# Patient Record
Sex: FEMALE | Race: WHITE | NOT HISPANIC OR LATINO | Employment: FULL TIME | URBAN - METROPOLITAN AREA
[De-identification: names, ages, dates, MRNs, and addresses within clinical notes are randomized per-mention and may not be internally consistent; named-entity substitution may affect disease eponyms.]

---

## 2024-07-18 ENCOUNTER — HOSPITAL ENCOUNTER (EMERGENCY)
Facility: HOSPITAL | Age: 34
Discharge: HOME | End: 2024-07-18
Attending: EMERGENCY MEDICINE
Payer: MEDICAID

## 2024-07-18 VITALS
BODY MASS INDEX: 31.39 KG/M2 | RESPIRATION RATE: 13 BRPM | OXYGEN SATURATION: 99 % | TEMPERATURE: 99 F | HEART RATE: 77 BPM | SYSTOLIC BLOOD PRESSURE: 111 MMHG | HEIGHT: 67 IN | WEIGHT: 200 LBS | DIASTOLIC BLOOD PRESSURE: 89 MMHG

## 2024-07-18 DIAGNOSIS — J18.9 PNEUMONIA OF RIGHT LUNG DUE TO INFECTIOUS ORGANISM, UNSPECIFIED PART OF LUNG: ICD-10-CM

## 2024-07-18 DIAGNOSIS — T14.90XA TRAUMA: ICD-10-CM

## 2024-07-18 DIAGNOSIS — R56.9 SEIZURE: Primary | ICD-10-CM

## 2024-07-18 DIAGNOSIS — R07.9 CHEST PAIN: ICD-10-CM

## 2024-07-18 LAB
ALBUMIN SERPL-MCNC: 3.8 G/DL (ref 3.5–5)
ALBUMIN/GLOB SERPL: 1.4 RATIO (ref 1.1–2)
ALP SERPL-CCNC: 42 UNIT/L (ref 40–150)
ALT SERPL-CCNC: 19 UNIT/L (ref 0–55)
ANION GAP SERPL CALC-SCNC: 12 MEQ/L
APTT PPP: 31.3 SECONDS (ref 23.2–33.7)
AST SERPL-CCNC: 21 UNIT/L (ref 5–34)
BASOPHILS # BLD AUTO: 0.07 X10(3)/MCL
BASOPHILS NFR BLD AUTO: 1.1 %
BILIRUB SERPL-MCNC: 0.3 MG/DL
BUN SERPL-MCNC: 8.4 MG/DL (ref 7–18.7)
CALCIUM SERPL-MCNC: 8.9 MG/DL (ref 8.4–10.2)
CHLORIDE SERPL-SCNC: 107 MMOL/L (ref 98–107)
CO2 SERPL-SCNC: 20 MMOL/L (ref 22–29)
CREAT SERPL-MCNC: 0.99 MG/DL (ref 0.55–1.02)
CREAT/UREA NIT SERPL: 8
EOSINOPHIL # BLD AUTO: 0.11 X10(3)/MCL (ref 0–0.9)
EOSINOPHIL NFR BLD AUTO: 1.8 %
ERYTHROCYTE [DISTWIDTH] IN BLOOD BY AUTOMATED COUNT: 12.4 % (ref 11.5–17)
ETHANOL SERPL-MCNC: <10 MG/DL
FLUAV AG UPPER RESP QL IA.RAPID: NOT DETECTED
FLUBV AG UPPER RESP QL IA.RAPID: NOT DETECTED
GFR SERPLBLD CREATININE-BSD FMLA CKD-EPI: >60 ML/MIN/1.73/M2
GLOBULIN SER-MCNC: 2.7 GM/DL (ref 2.4–3.5)
GLUCOSE SERPL-MCNC: 107 MG/DL (ref 74–100)
HCT VFR BLD AUTO: 38.2 % (ref 37–47)
HGB BLD-MCNC: 12.8 G/DL (ref 12–16)
IMM GRANULOCYTES # BLD AUTO: 0.02 X10(3)/MCL (ref 0–0.04)
IMM GRANULOCYTES NFR BLD AUTO: 0.3 %
INR PPP: 1
LYMPHOCYTES # BLD AUTO: 1.95 X10(3)/MCL (ref 0.6–4.6)
LYMPHOCYTES NFR BLD AUTO: 31.6 %
MCH RBC QN AUTO: 29.8 PG (ref 27–31)
MCHC RBC AUTO-ENTMCNC: 33.5 G/DL (ref 33–36)
MCV RBC AUTO: 88.8 FL (ref 80–94)
MONOCYTES # BLD AUTO: 0.51 X10(3)/MCL (ref 0.1–1.3)
MONOCYTES NFR BLD AUTO: 8.3 %
NEUTROPHILS # BLD AUTO: 3.52 X10(3)/MCL (ref 2.1–9.2)
NEUTROPHILS NFR BLD AUTO: 56.9 %
NRBC BLD AUTO-RTO: 0 %
PLATELET # BLD AUTO: 253 X10(3)/MCL (ref 130–400)
PMV BLD AUTO: 9.9 FL (ref 7.4–10.4)
POTASSIUM SERPL-SCNC: 3.8 MMOL/L (ref 3.5–5.1)
PROT SERPL-MCNC: 6.5 GM/DL (ref 6.4–8.3)
PROTHROMBIN TIME: 12.8 SECONDS (ref 12.5–14.5)
RBC # BLD AUTO: 4.3 X10(6)/MCL (ref 4.2–5.4)
SARS-COV-2 RNA RESP QL NAA+PROBE: NOT DETECTED
SODIUM SERPL-SCNC: 139 MMOL/L (ref 136–145)
TROPONIN I SERPL-MCNC: <0.01 NG/ML (ref 0–0.04)
WBC # BLD AUTO: 6.18 X10(3)/MCL (ref 4.5–11.5)

## 2024-07-18 PROCEDURE — 96374 THER/PROPH/DIAG INJ IV PUSH: CPT

## 2024-07-18 PROCEDURE — G0390 TRAUMA RESPONS W/HOSP CRITI: HCPCS

## 2024-07-18 PROCEDURE — 99285 EMERGENCY DEPT VISIT HI MDM: CPT | Mod: 25

## 2024-07-18 PROCEDURE — 82077 ASSAY SPEC XCP UR&BREATH IA: CPT | Performed by: EMERGENCY MEDICINE

## 2024-07-18 PROCEDURE — 84484 ASSAY OF TROPONIN QUANT: CPT | Performed by: EMERGENCY MEDICINE

## 2024-07-18 PROCEDURE — 85730 THROMBOPLASTIN TIME PARTIAL: CPT | Performed by: EMERGENCY MEDICINE

## 2024-07-18 PROCEDURE — 0240U COVID/FLU A&B PCR: CPT | Performed by: EMERGENCY MEDICINE

## 2024-07-18 PROCEDURE — 85610 PROTHROMBIN TIME: CPT | Performed by: EMERGENCY MEDICINE

## 2024-07-18 PROCEDURE — 80053 COMPREHEN METABOLIC PANEL: CPT | Performed by: EMERGENCY MEDICINE

## 2024-07-18 PROCEDURE — 93005 ELECTROCARDIOGRAM TRACING: CPT

## 2024-07-18 PROCEDURE — 99283 EMERGENCY DEPT VISIT LOW MDM: CPT | Mod: ,,,

## 2024-07-18 PROCEDURE — 96375 TX/PRO/DX INJ NEW DRUG ADDON: CPT

## 2024-07-18 PROCEDURE — 63600175 PHARM REV CODE 636 W HCPCS: Performed by: EMERGENCY MEDICINE

## 2024-07-18 PROCEDURE — 85025 COMPLETE CBC W/AUTO DIFF WBC: CPT | Performed by: EMERGENCY MEDICINE

## 2024-07-18 RX ORDER — LEVETIRACETAM 500 MG/5ML
1000 INJECTION, SOLUTION, CONCENTRATE INTRAVENOUS
Status: COMPLETED | OUTPATIENT
Start: 2024-07-18 | End: 2024-07-18

## 2024-07-18 RX ORDER — DOXYCYCLINE 100 MG/1
100 CAPSULE ORAL 2 TIMES DAILY
Qty: 20 CAPSULE | Refills: 0 | Status: SHIPPED | OUTPATIENT
Start: 2024-07-18 | End: 2024-07-28

## 2024-07-18 RX ORDER — LEVETIRACETAM 500 MG/5ML
1000 INJECTION, SOLUTION, CONCENTRATE INTRAVENOUS
Status: DISCONTINUED | OUTPATIENT
Start: 2024-07-18 | End: 2024-07-18

## 2024-07-18 RX ORDER — LEVETIRACETAM 10 MG/ML
INJECTION INTRAVASCULAR
Status: COMPLETED
Start: 2024-07-18 | End: 2024-07-18

## 2024-07-18 RX ORDER — ONDANSETRON HYDROCHLORIDE 2 MG/ML
INJECTION, SOLUTION INTRAVENOUS CODE/TRAUMA/SEDATION MEDICATION
Status: COMPLETED | OUTPATIENT
Start: 2024-07-18 | End: 2024-07-18

## 2024-07-18 RX ORDER — ONDANSETRON HYDROCHLORIDE 2 MG/ML
INJECTION, SOLUTION INTRAVENOUS
Status: DISCONTINUED
Start: 2024-07-18 | End: 2024-07-18 | Stop reason: HOSPADM

## 2024-07-18 RX ADMIN — LEVETIRACETAM 1000 MG: 100 INJECTION, SOLUTION INTRAVENOUS at 03:07

## 2024-07-18 RX ADMIN — ONDANSETRON 4 MG: 2 INJECTION INTRAMUSCULAR; INTRAVENOUS at 03:07

## 2024-07-18 NOTE — ED PROVIDER NOTES
Encounter Date: 7/18/2024    SCRIBE #1 NOTE: I, Hernesto Ragland, am scribing for, and in the presence of,  Thais Mojica MD. I have scribed the following portions of the note - Other sections scribed: HPI, ROS, PE.       History   No chief complaint on file.    33 year old female presents to ED via EMS as level 2 trauma activation following a fall onset just PTA. Per EMS, according to pt's family, pt had seizure like activity intermittently for about 30 minutes before falling out of bed. EMS reports that they are unsure if pt hit head. EMS reports they did not witness any seizures, gave 5mg of versed IN and 5mg of versed IV. CBG 89. Patient did have urinary incontinence and is postictal. Per EMS she takes oral keppra, has history of seizure like activity when anxious. Pt reports right-sided chest pain and dry cough, no SOB or fever, though history from patient is limited.     The history is provided by the EMS personnel. No  was used.     Review of patient's allergies indicates:  No Known Allergies  No past medical history on file.  No past surgical history on file.  No family history on file.     Review of Systems   Unable to perform ROS: Mental status change       Physical Exam     Initial Vitals [07/18/24 0329]   BP Pulse Resp Temp SpO2   (!) 136/100 91 17 99 °F (37.2 °C) 100 %      MAP       --         Physical Exam    Nursing note and vitals reviewed.  Constitutional: She appears well-developed and well-nourished. She is not diaphoretic. No distress.   Airway intact   HENT:   Head: Normocephalic and atraumatic.   Mouth/Throat: Oropharynx is clear and moist.   No oral trauma appreciated   Eyes: Conjunctivae and EOM are normal. Pupils are equal, round, and reactive to light.   Pupils 3-2 equal round and reactive   Neck: Neck supple.   No midline TTP, deformity or step-off   Normal range of motion.  Cardiovascular:  Normal rate, regular rhythm and intact distal pulses.           Pulses:        Radial pulses are 2+ on the right side and 2+ on the left side.        Dorsalis pedis pulses are 2+ on the right side and 2+ on the left side.   Pulmonary/Chest: Breath sounds normal. No respiratory distress. She has no wheezes. She has no rhonchi. She has no rales. She exhibits no tenderness.   Bilateral breath sounds   Abdominal: Abdomen is soft. Bowel sounds are normal. She exhibits no distension. There is no abdominal tenderness.   Musculoskeletal:         General: No tenderness or edema. Normal range of motion.      Cervical back: Normal range of motion and neck supple.      Comments: Pelvis stable, nontender  FROM about all major joints     Neurological: She is alert. She has normal strength. GCS eye subscore is 4. GCS verbal subscore is 4. GCS motor subscore is 6.   Initial GCS 13 improved to 14 in the trauma bay  E3 V4 M6 to E4 V4 M6   Skin: Skin is warm and dry. Capillary refill takes less than 2 seconds.   Psychiatric: She has a normal mood and affect.         ED Course   Procedures  Labs Reviewed   COMPREHENSIVE METABOLIC PANEL - Abnormal; Notable for the following components:       Result Value    CO2 20 (*)     Glucose 107 (*)     All other components within normal limits   APTT - Normal   COVID/FLU A&B PCR - Normal    Narrative:     The Xpert Xpress SARS-CoV-2/FLU/RSV plus is a rapid, multiplexed real-time PCR test intended for the simultaneous qualitative detection and differentiation of SARS-CoV-2, Influenza A, Influenza B, and respiratory syncytial virus (RSV) viral RNA in either nasopharyngeal swab or nasal swab specimens.         ALCOHOL,MEDICAL (ETHANOL) - Normal   PROTIME-INR - Normal   TROPONIN I - Normal   CBC W/ AUTO DIFFERENTIAL    Narrative:     The following orders were created for panel order CBC auto differential.  Procedure                               Abnormality         Status                     ---------                               -----------         ------                      CBC with Differential[[id number]]                           Final result                 Please view results for these tests on the individual orders.   CBC WITH DIFFERENTIAL     EKG Readings: (Independently Interpreted)   Initial Reading: No STEMI. Rhythm: Normal Sinus Rhythm. Heart Rate: 71. Ectopy: No Ectopy. Conduction: Normal. ST Segments: Normal ST Segments.   0353       Imaging Results              X-Ray Pelvis Routine AP (Final result)  Result time 07/18/24 12:10:42      Final result by Luli Kerr MD (07/18/24 12:10:42)                   Impression:      No acute osseous abnormality taking into account limitations related to collimation.      Electronically signed by: Luli Kerr  Date:    07/18/2024  Time:    12:10               Narrative:    EXAMINATION:  XR PELVIS ROUTINE AP    CLINICAL HISTORY:  Injury, unspecified, initial encounter    TECHNIQUE:  AP view of the pelvis was performed.    COMPARISON:  None.    FINDINGS:  Suboptimal collimation.  Lesser trochanters and inferior pubic rami incompletely imaged.  No appreciable fracture.  No dislocation.    Clip near the midline pelvis with appearance suggestive of tubal ligation clip.                                        X-Ray Chest AP Portable (Final result)  Result time 07/18/24 09:59:18      Final result by Howard Vazquez MD (07/18/24 09:59:18)                   Impression:      Ill-defined opacity in the right upper lobe of questionable etiology might represent confluent pleuroparenchymal in osseous markings apical lordotic projection might prove helpful for further assessment.    Otherwise no active pulmonary disease.    This report was flagged in Epic as abnormal.      Electronically signed by: Howard Vazquez  Date:    07/18/2024  Time:    09:59               Narrative:    EXAMINATION:  XR CHEST AP PORTABLE    CLINICAL HISTORY:  chest pain;    TECHNIQUE:  Single frontal view of the chest was  performed.    COMPARISON:  None    FINDINGS:  Examination reveals mediastinal cardiac silhouette to be within normal limits ill-defined opacities identified in the right apical region and might represent confluent pleuroparenchymal in osseous markings, however, an apical lordotic projection might prove helpful for further assessment.    Otherwise lung fields are clear and free of gross infiltrates atelectasis or effusions                                       CT Head Without Contrast (Final result)  Result time 07/18/24 07:31:46      Final result by Luli Kerr MD (07/18/24 07:31:46)                   Impression:      No appreciable acute intracranial abnormality.    The preliminary and final reports are concordant.      Electronically signed by: Luli Kerr  Date:    07/18/2024  Time:    07:31               Narrative:    EXAMINATION:  CT HEAD WITHOUT CONTRAST    CLINICAL HISTORY:  seizure;    TECHNIQUE:  Low dose axial CT images obtained throughout the head without intravenous contrast.  Axial, sagittal and coronal reconstructions were performed and interpreted.    DLP: 1347 mGycm    All CT scans at this location are performed using dose optimization techniques as appropriate to a performed exam including the following automated exposure control, adjustment of the mA and/or kV according to patient size and/or use of iterative reconstruction technique    COMPARISON:  No relevant prior available for comparison.    FINDINGS:  BRAIN: Gray white differentiation is maintained. White matter is within normal limits for age.  No hemorrhage. No edema. No mass effect or midline shift.  The posterior fossa and midline structures are unremarkable.    VENTRICLES: Normal in size and configuration.    EXTRA-AXIAL: No abnormal extra-axial collections.    BONES: Calvarium is intact.    SINUSES AND MASTOIDS: Visualized paranasal sinuses and mastoid air cells are clear.                        Preliminary result by  Dane Santos Jr., MD (07/18/24 03:57:05)                   Impression:    1. No acute intracranial traumatic injury identified. Details and other findings as noted above.               Narrative:    START OF REPORT:  Technique: CT of the head was performed without intravenous contrast with direct axial as well as coronal and sagittal reconstruction images.    Comparison: None.    Clinical history: Trauma lvl 2, seizure, fall out of bed.    FINDINGS:  Hemorrhage: No acute intracranial hemorrhage is seen.  CSF spaces: The ventricles sulci and basal cisterns are within normal limits.  Brain parenchyma: Unremarkable with preservation of the grey white junction throughout.  Cerebellum: Unremarkable.  Sella and skull base: Unremarkable.  Herniation: None.  Intracranial calcifications: Incidental note is made of bilateral choroid plexus calcification. Incidental note is made of some pineal region calcification.  Calvarium: No acute linear or depressed skull fracture is seen.    Maxillofacial Structures:  Paranasal sinuses: The visualized paranasal sinuses appear clear with no mucoperiosteal thickening or air fluid levels identified.  Orbits: Unremarkable.  Zygomatic arches: Unremarkable.  Temporal bones and mastoids: Unremarkable.  TMJ: The mandibular condyles appear normally placed with respect to the mandibular fossa.                                      X-Rays:   Independently Interpreted Readings:   Chest X-Ray: RUL infiltrate, no pneumothorax, hemothorax, fracture or free air   Head CT: No hemorrhage.   Other Readings:  Pelvis xray without acute fracture or pelvic ring disruption on my review        Medications   levETIRAcetam injection 1,000 mg (1,000 mg Intravenous Given 7/18/24 0332)   ondansetron injection ( Intravenous Canceled Entry 7/18/24 0345)   levETIRAcetam in NaCl (iso-os) (KEPPRA) 1,000 mg/100 mL IVPB (  Override Pull 7/18/24 0345)     Medical Decision Making  32 yo F with reported history of  seizure, unknown if compliant with keppra, here after seizure activity with fall out of bed. Reportedly she slid slowly to the ground, no obvious head trauma and she is not on anticoagulation therapy, however since the history is not certain, I will obtain CT head with labs, EKG and xrays. Monitor for now. Reassess.    Differential diagnosis includes but is not limited to seizure, PNES, electrolyte derangement, medication noncompliance, illicit drug use or withdrawal, and others.    Problems Addressed:  Pneumonia of right lung due to infectious organism, unspecified part of lung: acute illness or injury  Seizure: acute illness or injury  Trauma: acute illness or injury    Amount and/or Complexity of Data Reviewed  Independent Historian: EMS     Details: Per EMS, according to pt's family pt had seizure like activity intermittently for about 30 minutes before falling out of bed, EMS report that they are unsure if pt hit head. EMS report they did not witness any seizures, gave 5 of versed IN, and 5 of versed IV, CBG 89, pt urinated on her self en route, appeared postictal. Per EMS pt takes oral keppra, has history of seizure like activity when anxious.   Labs: ordered.  Radiology: ordered.    Risk  Prescription drug management.            Scribe Attestation:   Scribe #1: I performed the above scribed service and the documentation accurately describes the services I performed. I attest to the accuracy of the note.    Attending Attestation:           Physician Attestation for Scribe:  Physician Attestation Statement for Scribe #1: I, Thais Mojica MD, reviewed documentation, as scribed by Hernesto Ragland in my presence, and it is both accurate and complete.             ED Course as of 07/18/24 1749   Thu Jul 18, 2024   0556 Reexamine. Patient resting comfortably, more awake, oriented, back to baseline. She is from [place], currently on a road trip back from a mission trip. She missed one dose of Keppra and has not been  "sleeping well on the road. She denies any traumatic pain or injury, friend at bedside confirms patient was slowly lowered to the ground from the bed, no head trauma. She has no neck pain or tenderness on exam. She would like to go home. She is able to call her neurologist for medication recommendations however patient does have multiple reasons for breakthrough seizure - lack of sleep, missed Keppra dose yesterday. I have discussed her results and will treat her for CAP considering cough and chest discomfort. Return precautions discussed and she is discharged.    [RB]      ED Course User Index  [RB] Thais Mojica MD            /89   Pulse 77   Temp 99 °F (37.2 °C) (Oral)   Resp 13   Ht 5' 7" (1.702 m)   Wt 90.7 kg (200 lb)   SpO2 99%   BMI 31.32 kg/m²                    Clinical Impression:  Final diagnoses:  [R07.9] Chest pain  [T14.90XA] Trauma  [R56.9] Seizure (Primary)  [J18.9] Pneumonia of right lung due to infectious organism, unspecified part of lung          ED Disposition Condition    Discharge Stable          ED Prescriptions       Medication Sig Dispense Start Date End Date Auth. Provider    doxycycline (VIBRAMYCIN) 100 MG Cap Take 1 capsule (100 mg total) by mouth 2 (two) times daily. for 10 days 20 capsule 7/18/2024 7/28/2024 Thais Mojica MD          Follow-up Information       Follow up With Specialties Details Why Contact Info [facility] - Emergency Dept Emergency Medicine  As needed, If symptoms worsen [address] [facility] [id number] [phone]        Contact your neurologist today to make an appointment for reevaluation and medication adjustment as warranted        Contact your primary care provider for an appointment this week or early next week for re-evaluation and repeat chest x-ray as warranted             Thais Mojica MD  07/18/24 9023    "

## 2024-07-19 NOTE — CONSULTS
"   Trauma Surgery   Activation Note    Patient Name: Lynn Espinal  MRN: [id number]   YOB: 1990  Date: 07/18/2024    LEVEL 2 TRAUMA     Subjective:   History of present illness: Patient is an approximately 33 year old female who presents to the ED via EMS following seizure at home with fall from bed. EMS reports patient fell from bed feet first. History of seizures related to anxiety, on keppra. Reports not sleeping well lately. Received versed en route with EMS. Patient did have urinary incontinence.   I was present in the trauma bay at 0324.    Primary Survey:  A Clear, intact   B Unlabored, clear lung sounds bilaterally   C No signs of uncontrolled external hemorrhage, 2+ radial and DP pulses bilaterally   D GCS 14(E 4, V 4, M 6)    E exposed, log-rolled and examined (see below)   F See below     VITAL SIGNS: 24 HR MIN & MAX LAST   Temp  Min: 99 °F (37.2 °C)  Max: 99 °F (37.2 °C)  99 °F (37.2 °C)   BP  Min: 111/89  Max: 136/100  111/89    Pulse  Min: 67  Max: 91  77    Resp  Min: 12  Max: 21  13    SpO2  Min: 95 %  Max: 100 %  99 %      HT: 5' 7" (170.2 cm)  WT: 90.7 kg (200 lb)  BMI: 31.3     FAST: deferred    Medications/transfusions received en-route: Versed  Medications/transfusions received in trauma bay:  [facility]    ROS: 12 point ROS negative except as stated in HPI    Allergies: NKDA  PMH:  Anxiety, seizures  PSH: Unknown  Social history: Unknown  Objective:   Secondary Survey:   General: Well developed, well nourished, no acute distress  Neuro: CNII-XII grossly intact  HEENT:  Normocephalic, atraumatic, PERRL  CV:  RRR  Pulse: 2+ RP b/l, 2+ DP b/l   Resp/chest:  Non-labored breathing, satting on room air  GI:  Abdomen soft, non-tender, non-distended  :  Deferred   Rectal: Deferred. Intact gluteal squeeze noted.  Extremities: Moves all 4 spontaneously and purposefully, no obvious gross deformities.  Back/Spine: No bony TTP, no palpable step offs or deformities.  Cervical back: Normal. No " tenderness.  Thoracic back: Normal. No tenderness.  Lumbar back: Normal. No tenderness.  Skin/wounds:  Warm, well perfused, intact    Labs:  Reviewed    Imaging:  CXR: No acute traumatic injury. RUL opacity.     Pelvis XR: No traumatic injury.    CT Head: No appreciable acute intracranial abnormality.     Assessment & Plan:   Patient is a 33 year old female who had a seizure and fell from bed. Labs and imaging reviewed. No acute traumatic injuries identified. Discussed with Dr. Mojica, no indication for trauma admission. Final dispo per EM physician.     Claudia Fonseca, AGACNP-BC, FNP-BC  Trauma Surgery  [facility]  C: [phone]